# Patient Record
Sex: FEMALE | Race: WHITE
[De-identification: names, ages, dates, MRNs, and addresses within clinical notes are randomized per-mention and may not be internally consistent; named-entity substitution may affect disease eponyms.]

---

## 2017-02-18 ENCOUNTER — HOSPITAL ENCOUNTER (OUTPATIENT)
Dept: HOSPITAL 45 - C.LAB | Age: 47
Discharge: HOME | End: 2017-02-18
Attending: FAMILY MEDICINE
Payer: COMMERCIAL

## 2017-02-18 DIAGNOSIS — E78.5: ICD-10-CM

## 2017-02-18 DIAGNOSIS — Z51.81: ICD-10-CM

## 2017-02-18 DIAGNOSIS — E53.8: Primary | ICD-10-CM

## 2017-02-18 LAB
ALBUMIN/GLOB SERPL: 1.1 {RATIO} (ref 0.9–2)
ALP SERPL-CCNC: 88 U/L (ref 45–117)
ALT SERPL-CCNC: 27 U/L (ref 12–78)
ANION GAP SERPL CALC-SCNC: 10 MMOL/L (ref 3–11)
AST SERPL-CCNC: 15 U/L (ref 15–37)
BENZODIAZ UR-MCNC: (no result) UG/L
BUN SERPL-MCNC: 25 MG/DL (ref 7–18)
BUN/CREAT SERPL: 26.6 (ref 10–20)
CALCIUM SERPL-MCNC: 8.9 MG/DL (ref 8.5–10.1)
CHLORIDE SERPL-SCNC: 108 MMOL/L (ref 98–107)
CHOLEST/HDLC SERPL: 7.1 {RATIO}
CO2 SERPL-SCNC: 27 MMOL/L (ref 21–32)
CREAT SERPL-MCNC: 0.94 MG/DL (ref 0.6–1.2)
EOSINOPHIL NFR BLD AUTO: 191 K/UL (ref 130–400)
GLOBULIN SER-MCNC: 3.4 GM/DL (ref 2.5–4)
GLUCOSE SERPL-MCNC: 86 MG/DL (ref 70–99)
GLUCOSE UR QL: 43 MG/DL
HCT VFR BLD CALC: 45 % (ref 37–47)
MCH RBC QN AUTO: 27.5 PG (ref 25–34)
MCHC RBC AUTO-ENTMCNC: 33.1 G/DL (ref 32–36)
MCV RBC AUTO: 83.2 FL (ref 80–100)
NITRITE UR QL STRIP: 589 MG/DL (ref 0–150)
PCP UR-MCNC: (no result) UG/L
PH UR: 304 MG/DL (ref 0–200)
PMV BLD AUTO: 9.7 FL (ref 7.4–10.4)
POTASSIUM SERPL-SCNC: 4 MMOL/L (ref 3.5–5.1)
RBC # BLD AUTO: 5.41 M/UL (ref 4.2–5.4)
SODIUM SERPL-SCNC: 143 MMOL/L (ref 136–145)
WBC # BLD AUTO: 3.83 K/UL (ref 4.8–10.8)

## 2017-02-23 LAB
COD UR: NEGATIVE NG/ML
HYDROCOD UR: NEGATIVE NG/ML
HYDROMOR UR: NEGATIVE NG/ML
MORPHINE UR CFM-MCNC: NEGATIVE NG/ML
NORHYDROCODONE CONF UR: NEGATIVE NG/ML
OXYMORPH UR: 175 NG/ML

## 2017-02-24 ENCOUNTER — HOSPITAL ENCOUNTER (OUTPATIENT)
Dept: HOSPITAL 45 - C.MRI | Age: 47
Discharge: HOME | End: 2017-02-24
Attending: PHYSICAL MEDICINE & REHABILITATION
Payer: COMMERCIAL

## 2017-02-24 DIAGNOSIS — M47.817: Primary | ICD-10-CM

## 2017-02-24 NOTE — DIAGNOSTIC IMAGING REPORT
LUMBAR SPINE MRI



HISTORY:      Low back pain. History of annular tear.



TECHNIQUE: Multiplanar multisequence MRI of the lumbar spine was performed

without the use of contrast.



COMPARISON:  Lumbar spine MRI 11/13/2015.



FINDINGS: For the purpose of the report the L5-S1 disc space will be located on

axial image 27 of 30.

Alignment and curvature intact. No fracture or subluxation. Disc spaces are

preserved. The conus terminates at the inferior aspect of the L2 vertebral body.

This remains unchanged. Paravertebral soft tissues are unremarkable.



L1-L2: No significant central canal or neural foraminal narrowing.



L2-L3: No significant central canal or neural foraminal narrowing.



L3-L4: Small left foraminal disc protrusion which abuts and slightly displaces

the exiting left L3 nerve root with mild left neural foraminal narrowing. No

significant central canal narrowing.



L4-L5: Tiny left foraminal annular tear. No disc herniations. No central canal

narrowing. There is mild bilateral neural foraminal narrowing due to the mild

facet hypertrophy. This remains unchanged.



L5-S1: Tiny right paracentral annular tear. No disc herniations. No significant

central canal or neural foraminal narrowing.



IMPRESSION:  



1. Overall, no significant change compared to the prior study.

2. Small left foraminal disc protrusion at L3-L4 which abuts and slightly

displaces the exiting left L3 nerve root.

3. Additional findings as described above. 







Electronically signed by:  Javon Valdez M.D.

2/24/2017 9:10 AM



Dictated Date/Time:  2/24/2017 9:02 AM

## 2017-03-23 ENCOUNTER — HOSPITAL ENCOUNTER (OUTPATIENT)
Dept: HOSPITAL 45 - X.SURG | Age: 47
Discharge: HOME | End: 2017-03-23
Attending: PHYSICAL MEDICINE & REHABILITATION
Payer: COMMERCIAL

## 2017-03-23 VITALS — TEMPERATURE: 99.14 F

## 2017-03-23 VITALS
HEIGHT: 67.01 IN | BODY MASS INDEX: 40.11 KG/M2 | BODY MASS INDEX: 40.11 KG/M2 | WEIGHT: 255.54 LBS | WEIGHT: 255.54 LBS | HEIGHT: 67.01 IN

## 2017-03-23 VITALS — DIASTOLIC BLOOD PRESSURE: 89 MMHG | HEART RATE: 85 BPM | SYSTOLIC BLOOD PRESSURE: 138 MMHG | OXYGEN SATURATION: 94 %

## 2017-03-23 DIAGNOSIS — M46.1: Primary | ICD-10-CM

## 2017-03-23 NOTE — DISCHARGE INSTRUCTIONS
Discharge Instructions


Date of Service


Mar 23, 2017.





Visit


Reason for Visit:  Sacroiliitis





Discharge


Discharge Diagnosis / Problem:  low back pain





Discharge Goals


Goal(s):  Decrease discomfort, Improve function





Activity Recommendations


Activity Limitations:  resume your previous activity





Anesthesia


.





Post Anesthesia Instructions:





If you have had General Anesthesia or IV Sedation:





*  Do not drive today.


*  Resume driving when surgeon permits.


*  Do not make important decisions or sign legal documents today.


*  Call surgeon for:





   1.  Temperature elevations greater than 101 degrees F.


   2.  Uncontrollable pain.


   3.  Excessive bleeding.


   4.  Persistent nausea and vomiting.


   5.  Medication intolerance (nausea, vomiting or rash).





*  For nausea and vomiting use only clear liquids such as: tea, soda, bouillon 

until nausea subsides, then gradually increase diet as tolerated.





*  If you have any concerns or questions, call your surgeon's office.  If 

physician is unavailable and it is an emergency, call 911 or go to the nearest 

emergency room.





.





Diet Recommendations


Recommended Home Diet:  resume previous diet





Procedures


Procedures Performed:  


Bilateral Sacroiliac Joint Injections





Pending Studies


Studies pending at discharge:  no





Medical Emergencies








.


Who to Call and When:





Medical Emergencies:  If at any time you feel your situation is an emergency, 

please call 911 immediately.





.





Non-Emergent Contact


Non-Emergency issues call your:  Specialist





.


.





"Provider Documentation" section prepared by Sachin Mcarthur.

## 2017-03-23 NOTE — OPERATIVE REPORT
DATE OF OPERATION:  03/23/2017

 

PREOPERATIVE DIAGNOSIS:  Bilateral sacroiliitis.

 

POSTOPERATIVE DIAGNOSIS:  Same.

 

PROCEDURE:  Bilateral sacroiliac joint injections under fluoroscopic

guidance.

 

INDICATIONS:  The patient is a 46-year-old white female who has really

localizing pain across the sacroiliac region.  She responded briefly to

medial branch block, but is describing pain that is more intense below this

area corresponding the sacroiliac joint.  She presents today for SI joint

injections to provide her with relief.

 

PHYSICAL EXAMINATION:  Pleasant female seated comfortably.  Paraspinal

muscles were nontender.  She is exquisitely tender to palpation over the

right SI joint and left SI joint, positive No maneuver bilaterally,

positive sacral compression maneuver.

 

CONSENT:  Verbal and written consent was obtained from the patient.  Risks

and benefits were reviewed.  Risks include but are not limited to abscess and

allergic reaction.  She wishes to proceed.

 

DESCRIPTION OF PROCEDURE:  The patient was taken back to the special

procedures room of the [facility] where she was maintained

in prone position.  Backside was cleansed with Betadine x3, dry sterile

dressing was applied.  Fluoroscope was used to identify the left sacroiliac

joint and the overlying skin overlying the joint was then anesthetized with

2.5 mL of lidocaine 1% with a 25 gauge 1.5-inch needle.  A 22 gauge 5-inch

needle was then placed into the SI joint under fluoroscopic guidance.  There

was discomfort as the joint was entered.  She then underwent injection of

0.25 mL of Isovue 300 contrast to show intra-articular placement.  This was

then followed by injection after negative aspiration of 40 mg of Depo-Medrol

and 1.5 mL of bupivacaine.  The right SI joint then was fluoroscopically

identified.  Overlying skin anesthetized with 2.5 mL of lidocaine 1% with a

25 gauge 1.5-inch needle.  A 22 gauge 5-inch needle was then placed into the

SI joint injecting with 1.5 mL of bupivacaine 0.25%, 40 mg of Depo-Medrol. 

Injection was well tolerated.  Injection was done after negative aspiration

and also confirmation with 0.25 mL of Isovue 300 contrast to confirm it to be

intraarticularly placed.

 

DISPOSITION:

1.  The patient is taken out into the discharge recovery area where she will

be discharged home once discharge criteria have been met.

2.  Follow up in the [facility] office in 4-6 weeks.

 

 

I attest to the content of the Intraoperative Record and any orders documented therein. Any exceptio
ns are noted below.

## 2017-07-13 ENCOUNTER — HOSPITAL ENCOUNTER (OUTPATIENT)
Dept: HOSPITAL 45 - X.SURG | Age: 47
Discharge: HOME | End: 2017-07-13
Attending: PHYSICAL MEDICINE & REHABILITATION
Payer: COMMERCIAL

## 2017-07-13 VITALS
HEIGHT: 67.01 IN | HEIGHT: 67.01 IN | WEIGHT: 275.58 LBS | WEIGHT: 275.58 LBS | BODY MASS INDEX: 43.25 KG/M2 | BODY MASS INDEX: 43.25 KG/M2

## 2017-07-13 VITALS — DIASTOLIC BLOOD PRESSURE: 90 MMHG | OXYGEN SATURATION: 96 % | HEART RATE: 76 BPM | SYSTOLIC BLOOD PRESSURE: 130 MMHG

## 2017-07-13 VITALS — TEMPERATURE: 98.24 F

## 2017-07-13 DIAGNOSIS — M46.1: Primary | ICD-10-CM

## 2017-07-13 NOTE — [facility] OPERATIVE REPORT
Operative Report


Date of Service


Jul 13, 2017.





Operative Report


DATE OF OPERATION:  07/13/2017


 


PREOPERATIVE DIAGNOSIS:  Bilateral sacroiliitis.


 


POSTOPERATIVE DIAGNOSIS:  Same.


 


PROCEDURE:  Bilateral sacroiliac joint injections under fluoroscopic


guidance.


 


INDICATIONS:  The patient is a 47-year-old white female who has really


localizing pain across the sacroiliac region.  She responded in the past to 

bilateral SI joint injections.  She presents today for SI joint


injections to provide her with relief.


 


PHYSICAL EXAMINATION:  Pleasant female seated comfortably.  Paraspinal


muscles were nontender.  She is exquisitely tender to palpation over the


right SI joint and left SI joint, positive No maneuver bilaterally,


positive sacral compression maneuver.


 


CONSENT:  Verbal and written consent was obtained from the patient.  Risks


and benefits were reviewed.  Risks include but are not limited to abscess and


allergic reaction.  She wishes to proceed.


 


DESCRIPTION OF PROCEDURE:  The patient was taken back to the special


procedures room of the [facility] where she was maintained


in prone position.  Backside was cleansed with Betadine x3, dry sterile


dressing was applied.  Fluoroscope was used to identify the left sacroiliac


joint and the overlying skin overlying the joint was then anesthetized with


2.5 mL of lidocaine 1% with a 25 gauge 1.5-inch needle.  A 22 gauge 5-inch


needle was then placed into the SI joint under fluoroscopic guidance.  There


was discomfort as the joint was entered.  She then underwent injection of


0.25 mL of Isovue 300 contrast to show intra-articular placement.  This was


then followed by injection after negative aspiration of 40 mg of Depo-Medrol


and 1.5 mL of bupivacaine.  The right SI joint then was fluoroscopically


identified.  Overlying skin anesthetized with 2.5 mL of lidocaine 1% with a


25 gauge 1.5-inch needle.  A 22 gauge 5-inch needle was then placed into the


SI joint injecting with 1.5 mL of bupivacaine 0.25%, 40 mg of Depo-Medrol. 


Injection was well tolerated.  Injection was done after negative aspiration


and also confirmation with 0.25 mL of Isovue 300 contrast to confirm it to be


intraarticularly placed.


 


DISPOSITION:


1.  The patient is taken out into the discharge recovery area where she will


be discharged home once discharge criteria have been met.


2.  Follow up in the [facility] office in 4-6 weeks.


 


 


I attest to the content of the Intraoperative Record and any orders documented 

therein. Any exceptions are noted below.


I attest to the content of the Intraoperative Record and any orders documented 

therein.  Any exceptions are noted below.

## 2017-07-13 NOTE — DISCHARGE INSTRUCTIONS
Discharge Instructions


Date of Service


Jul 13, 2017.





Visit


Reason for Visit:  Sacroiliitis





Discharge


Discharge Diagnosis / Problem:  low back pain





Discharge Goals


Goal(s):  Decrease discomfort, Improve function





Activity Recommendations


Activity Limitations:  resume your previous activity





Anesthesia


.





Post Anesthesia Instructions:





If you have had General Anesthesia or IV Sedation:





*  Do not drive today.


*  Resume driving when surgeon permits.


*  Do not make important decisions or sign legal documents today.


*  Call surgeon for:





   1.  Temperature elevations greater than 101 degrees F.


   2.  Uncontrollable pain.


   3.  Excessive bleeding.


   4.  Persistent nausea and vomiting.


   5.  Medication intolerance (nausea, vomiting or rash).





*  For nausea and vomiting use only clear liquids such as: tea, soda, bouillon 

until nausea subsides, then gradually increase diet as tolerated.





*  If you have any concerns or questions, call your surgeon's office.  If 

physician is unavailable and it is an emergency, call 911 or go to the nearest 

emergency room.





.





Diet Recommendations


Recommended Home Diet:  resume previous diet





Procedures


Procedures Performed:  


BILATERAL SACROILIAC JOINT INJECTIONS





Pending Studies


Studies pending at discharge:  no





Medical Emergencies








.


Who to Call and When:





Medical Emergencies:  If at any time you feel your situation is an emergency, 

please call 911 immediately.





.





Non-Emergent Contact


Non-Emergency issues call your:  Specialist





.


.








"Provider Documentation" section prepared by Sachin Mcarthur.








.

## 2018-01-11 ENCOUNTER — HOSPITAL ENCOUNTER (OUTPATIENT)
Dept: HOSPITAL 45 - C.LABPBG | Age: 48
Discharge: HOME | End: 2018-01-11
Attending: FAMILY MEDICINE
Payer: COMMERCIAL

## 2018-01-11 DIAGNOSIS — E78.5: Primary | ICD-10-CM

## 2018-01-11 DIAGNOSIS — E55.9: ICD-10-CM

## 2018-01-11 DIAGNOSIS — R41.3: ICD-10-CM

## 2018-01-11 DIAGNOSIS — M79.7: ICD-10-CM

## 2018-01-11 LAB
BASOPHILS # BLD: 0.01 K/UL (ref 0–0.2)
BASOPHILS NFR BLD: 0.3 %
BUN SERPL-MCNC: 10 MG/DL (ref 7–18)
CALCIUM SERPL-MCNC: 9.2 MG/DL (ref 8.5–10.1)
CO2 SERPL-SCNC: 30 MMOL/L (ref 21–32)
CREAT SERPL-MCNC: 0.91 MG/DL (ref 0.6–1.2)
EOS ABS #: 0.12 K/UL (ref 0–0.5)
EOSINOPHIL NFR BLD AUTO: 208 K/UL (ref 130–400)
GLUCOSE SERPL-MCNC: 127 MG/DL (ref 70–99)
HCT VFR BLD CALC: 46.2 % (ref 37–47)
HGB BLD-MCNC: 15 G/DL (ref 12–16)
IG#: 0 K/UL (ref 0–0.02)
IMM GRANULOCYTES NFR BLD AUTO: 24.6 %
KETONES UR QL STRIP: 168 MG/DL
LYMPHOCYTES # BLD: 0.92 K/UL (ref 1.2–3.4)
MCH RBC QN AUTO: 27.3 PG (ref 25–34)
MCHC RBC AUTO-ENTMCNC: 32.5 G/DL (ref 32–36)
MCV RBC AUTO: 84.2 FL (ref 80–100)
MONO ABS #: 0.33 K/UL (ref 0.11–0.59)
MONOCYTES NFR BLD: 8.8 %
NEUT ABS #: 2.36 K/UL (ref 1.4–6.5)
NEUTROPHILS # BLD AUTO: 3.2 %
NEUTROPHILS NFR BLD AUTO: 63.1 %
PH UR: 259 MG/DL (ref 0–200)
PMV BLD AUTO: 10.2 FL (ref 7.4–10.4)
POTASSIUM SERPL-SCNC: 3.2 MMOL/L (ref 3.5–5.1)
RED CELL DISTRIBUTION WIDTH CV: 14.3 % (ref 11.5–14.5)
RED CELL DISTRIBUTION WIDTH SD: 44.2 FL (ref 36.4–46.3)
SODIUM SERPL-SCNC: 137 MMOL/L (ref 136–145)
WBC # BLD AUTO: 3.74 K/UL (ref 4.8–10.8)